# Patient Record
Sex: FEMALE | Race: OTHER | HISPANIC OR LATINO | URBAN - METROPOLITAN AREA
[De-identification: names, ages, dates, MRNs, and addresses within clinical notes are randomized per-mention and may not be internally consistent; named-entity substitution may affect disease eponyms.]

---

## 2024-08-03 PROBLEM — Z00.00 ENCOUNTER FOR PREVENTIVE HEALTH EXAMINATION: Status: ACTIVE | Noted: 2024-08-03

## 2024-08-09 ENCOUNTER — EMERGENCY (EMERGENCY)
Facility: HOSPITAL | Age: 49
LOS: 1 days | Discharge: HOME | End: 2024-08-09
Attending: EMERGENCY MEDICINE
Payer: MEDICAID

## 2024-08-09 VITALS
OXYGEN SATURATION: 98 % | TEMPERATURE: 98 F | RESPIRATION RATE: 18 BRPM | DIASTOLIC BLOOD PRESSURE: 75 MMHG | SYSTOLIC BLOOD PRESSURE: 112 MMHG | WEIGHT: 176.37 LBS | HEART RATE: 95 BPM

## 2024-08-09 VITALS
SYSTOLIC BLOOD PRESSURE: 118 MMHG | OXYGEN SATURATION: 98 % | TEMPERATURE: 98 F | RESPIRATION RATE: 18 BRPM | HEART RATE: 80 BPM | DIASTOLIC BLOOD PRESSURE: 79 MMHG

## 2024-08-09 LAB
ALBUMIN SERPL ELPH-MCNC: 3.5 G/DL — SIGNIFICANT CHANGE UP (ref 3.5–5)
ALP SERPL-CCNC: 75 U/L — SIGNIFICANT CHANGE UP (ref 40–120)
ALT FLD-CCNC: 53 U/L DA — SIGNIFICANT CHANGE UP (ref 10–60)
ANION GAP SERPL CALC-SCNC: 5 MMOL/L — SIGNIFICANT CHANGE UP (ref 5–17)
APPEARANCE UR: CLEAR — SIGNIFICANT CHANGE UP
AST SERPL-CCNC: 27 U/L — SIGNIFICANT CHANGE UP (ref 10–40)
BASOPHILS # BLD AUTO: 0.04 K/UL — SIGNIFICANT CHANGE UP (ref 0–0.2)
BASOPHILS NFR BLD AUTO: 0.4 % — SIGNIFICANT CHANGE UP (ref 0–2)
BILIRUB SERPL-MCNC: 0.2 MG/DL — SIGNIFICANT CHANGE UP (ref 0.2–1.2)
BILIRUB UR-MCNC: NEGATIVE — SIGNIFICANT CHANGE UP
BUN SERPL-MCNC: 13 MG/DL — SIGNIFICANT CHANGE UP (ref 7–18)
CALCIUM SERPL-MCNC: 9.1 MG/DL — SIGNIFICANT CHANGE UP (ref 8.4–10.5)
CHLORIDE SERPL-SCNC: 106 MMOL/L — SIGNIFICANT CHANGE UP (ref 96–108)
CO2 SERPL-SCNC: 25 MMOL/L — SIGNIFICANT CHANGE UP (ref 22–31)
COLOR SPEC: YELLOW — SIGNIFICANT CHANGE UP
CREAT SERPL-MCNC: 0.68 MG/DL — SIGNIFICANT CHANGE UP (ref 0.5–1.3)
DIFF PNL FLD: NEGATIVE — SIGNIFICANT CHANGE UP
EGFR: 107 ML/MIN/1.73M2 — SIGNIFICANT CHANGE UP
EOSINOPHIL # BLD AUTO: 0.21 K/UL — SIGNIFICANT CHANGE UP (ref 0–0.5)
EOSINOPHIL NFR BLD AUTO: 2.1 % — SIGNIFICANT CHANGE UP (ref 0–6)
GLUCOSE SERPL-MCNC: 93 MG/DL — SIGNIFICANT CHANGE UP (ref 70–99)
GLUCOSE UR QL: NEGATIVE MG/DL — SIGNIFICANT CHANGE UP
HCG SERPL-ACNC: <1 MIU/ML — SIGNIFICANT CHANGE UP
HCT VFR BLD CALC: 34.6 % — SIGNIFICANT CHANGE UP (ref 34.5–45)
HGB BLD-MCNC: 11.2 G/DL — LOW (ref 11.5–15.5)
IMM GRANULOCYTES NFR BLD AUTO: 0.2 % — SIGNIFICANT CHANGE UP (ref 0–0.9)
KETONES UR-MCNC: NEGATIVE MG/DL — SIGNIFICANT CHANGE UP
LEUKOCYTE ESTERASE UR-ACNC: NEGATIVE — SIGNIFICANT CHANGE UP
LIDOCAIN IGE QN: 43 U/L — SIGNIFICANT CHANGE UP (ref 13–75)
LYMPHOCYTES # BLD AUTO: 2.26 K/UL — SIGNIFICANT CHANGE UP (ref 1–3.3)
LYMPHOCYTES # BLD AUTO: 22.2 % — SIGNIFICANT CHANGE UP (ref 13–44)
MCHC RBC-ENTMCNC: 28.4 PG — SIGNIFICANT CHANGE UP (ref 27–34)
MCHC RBC-ENTMCNC: 32.4 GM/DL — SIGNIFICANT CHANGE UP (ref 32–36)
MCV RBC AUTO: 87.6 FL — SIGNIFICANT CHANGE UP (ref 80–100)
MONOCYTES # BLD AUTO: 0.71 K/UL — SIGNIFICANT CHANGE UP (ref 0–0.9)
MONOCYTES NFR BLD AUTO: 7 % — SIGNIFICANT CHANGE UP (ref 2–14)
NEUTROPHILS # BLD AUTO: 6.94 K/UL — SIGNIFICANT CHANGE UP (ref 1.8–7.4)
NEUTROPHILS NFR BLD AUTO: 68.1 % — SIGNIFICANT CHANGE UP (ref 43–77)
NITRITE UR-MCNC: NEGATIVE — SIGNIFICANT CHANGE UP
NRBC # BLD: 0 /100 WBCS — SIGNIFICANT CHANGE UP (ref 0–0)
PH UR: 7 — SIGNIFICANT CHANGE UP (ref 5–8)
PLATELET # BLD AUTO: 260 K/UL — SIGNIFICANT CHANGE UP (ref 150–400)
POTASSIUM SERPL-MCNC: 4 MMOL/L — SIGNIFICANT CHANGE UP (ref 3.5–5.3)
POTASSIUM SERPL-SCNC: 4 MMOL/L — SIGNIFICANT CHANGE UP (ref 3.5–5.3)
PROT SERPL-MCNC: 7.4 G/DL — SIGNIFICANT CHANGE UP (ref 6–8.3)
PROT UR-MCNC: NEGATIVE MG/DL — SIGNIFICANT CHANGE UP
RBC # BLD: 3.95 M/UL — SIGNIFICANT CHANGE UP (ref 3.8–5.2)
RBC # FLD: 16.6 % — HIGH (ref 10.3–14.5)
SODIUM SERPL-SCNC: 136 MMOL/L — SIGNIFICANT CHANGE UP (ref 135–145)
SP GR SPEC: 1.01 — SIGNIFICANT CHANGE UP (ref 1–1.03)
UROBILINOGEN FLD QL: 0.2 MG/DL — SIGNIFICANT CHANGE UP (ref 0.2–1)
WBC # BLD: 10.18 K/UL — SIGNIFICANT CHANGE UP (ref 3.8–10.5)
WBC # FLD AUTO: 10.18 K/UL — SIGNIFICANT CHANGE UP (ref 3.8–10.5)

## 2024-08-09 PROCEDURE — 80053 COMPREHEN METABOLIC PANEL: CPT

## 2024-08-09 PROCEDURE — 96374 THER/PROPH/DIAG INJ IV PUSH: CPT

## 2024-08-09 PROCEDURE — 74176 CT ABD & PELVIS W/O CONTRAST: CPT | Mod: 26,MC

## 2024-08-09 PROCEDURE — 84702 CHORIONIC GONADOTROPIN TEST: CPT

## 2024-08-09 PROCEDURE — 36415 COLL VENOUS BLD VENIPUNCTURE: CPT

## 2024-08-09 PROCEDURE — 74176 CT ABD & PELVIS W/O CONTRAST: CPT | Mod: MC

## 2024-08-09 PROCEDURE — 99284 EMERGENCY DEPT VISIT MOD MDM: CPT

## 2024-08-09 PROCEDURE — 81003 URINALYSIS AUTO W/O SCOPE: CPT

## 2024-08-09 PROCEDURE — 85025 COMPLETE CBC W/AUTO DIFF WBC: CPT

## 2024-08-09 PROCEDURE — 99284 EMERGENCY DEPT VISIT MOD MDM: CPT | Mod: 25

## 2024-08-09 PROCEDURE — 83690 ASSAY OF LIPASE: CPT

## 2024-08-09 RX ORDER — IBUPROFEN 200 MG
1 TABLET ORAL
Qty: 30 | Refills: 0
Start: 2024-08-09

## 2024-08-09 RX ORDER — KETOROLAC TROMETHAMINE 10 MG
15 TABLET ORAL ONCE
Refills: 0 | Status: DISCONTINUED | OUTPATIENT
Start: 2024-08-09 | End: 2024-08-09

## 2024-08-09 RX ORDER — BACTERIOSTATIC SODIUM CHLORIDE 0.9 %
1000 VIAL (ML) INJECTION ONCE
Refills: 0 | Status: COMPLETED | OUTPATIENT
Start: 2024-08-09 | End: 2024-08-09

## 2024-08-09 RX ADMIN — Medication 15 MILLIGRAM(S): at 18:17

## 2024-08-09 RX ADMIN — Medication 15 MILLIGRAM(S): at 18:47

## 2024-08-09 RX ADMIN — Medication 1000 MILLILITER(S): at 18:16

## 2024-08-09 NOTE — ED PROVIDER NOTE - PATIENT PORTAL LINK FT
You can access the FollowMyHealth Patient Portal offered by [facility] by registering at the following website: [email or web address]. By joining [facility]’s FollowMyHealth portal, you will also be able to view your health information using other applications (apps) compatible with our system.

## 2024-08-09 NOTE — ED PROVIDER NOTE - OBJECTIVE STATEMENT
48-year-old female history of kidney stones presents with bilateral flank pain from yesterday got worse today.  Pain rating to bilateral lower abdomen.  Patient notes some nausea no vomiting notes slight increased urinary frequency.  History of .

## 2024-08-09 NOTE — ED PROVIDER NOTE - NSFOLLOWUPINSTRUCTIONS_ED_ALL_ED_FT
Dolor de espalda lavelle en los adultos  Acute Back Pain, Adult  El dolor de espalda lavelle es repentino y por lo general no dura mucho tiempo. Se debe generalmente a randa lesión de los músculos y tejidos de la espalda. La lesión puede ser el resultado de:  Estiramiento en exceso o desgarro de un músculo, tendón o ligamento. Los ligamentos son tejidos que conectan los huesos. Levantar algo de forma incorrecta puede producir un esguince de espalda.  Desgaste (degeneración) de los discos vertebrales. Los discos vertebrales son tejidos circulares que proporcionan amortiguación entre los huesos de la columna vertebral (vértebras).  Movimientos de giro, caryn al practicar deportes o realizar trabajos de jardinería.  Un golpe en la espalda.  Artritis.  Es posible que le realicen un examen físico, análisis de laboratorio u otros estudios de diagnóstico por imágenes para encontrar la causa del dolor. El dolor de espalda lavelle generalmente desaparece con reposo y cuidados en la casa.    Siga estas instrucciones en sullivan casa:  Control del dolor, la rigidez y la hinchazón    Use los medicamentos de venta jamshid y los recetados solamente caryn se lo haya indicado el médico. El tratamiento puede incluir medicamentos para el dolor y la inflamación que se callum por la boca o que se aplican sobre la piel, o relajantes musculares.  El médico puede recomendarle que se aplique hielo jacinda las primeras 24 a 48 horas después del comienzo del dolor. Para hacer esto:  Ponga el hielo en randa bolsa plástica.  Coloque randa toalla entre la piel y la bolsa.  Aplique el hielo jacinda 20 minutos, 2 o 3 veces por día.  Retire el hielo si la piel se pone de color wong brillante. [place] es muy importante. Si no puede sentir dolor, calor o frío, tiene un mayor riesgo de que se dañe la tyrese.  Si se lo indican, aplique calor en la tyrese afectada con la frecuencia que le haya indicado el médico. Use la elsa de calor que el médico le recomiende, caryn randa compresa de calor húmedo o randa almohadilla térmica.  Coloque randa toalla entre la piel y la elsa de calor.  Aplique calor jacinda 20 a 30 minutos.  Retire la elsa de calor si la piel se pone de color wong brillante. [place] es especialmente importante si no puede sentir dolor, calor o frío. Corre un mayor riesgo de sufrir quemaduras.  Actividad    Comparisons of good and bad posture while driving, standing, sitting at a desk, and lifting heavy objects.  No permanezca en la cama. Hacer reposo en la cama por más de 1 a 2 días puede demorar sullivan recuperación.  Mantenga randa buena postura al sentarse y pararse. No se incline hacia adelante al sentarse ni se encorve al pararse.  Si trabaja en un escritorio, siéntese cerca de daniel para no tener que inclinarse. Mantenga el mentón hacia abajo. Mantenga el marina hacia atrás y los codos flexionados en un ángulo de 90 grados (ángulo recto).  Cuando conduzca, siéntese elevado y cerca del volante. Agregue un apoyo para la espalda (lumbar) al asiento del automóvil, si es necesario.  Realice caminatas cortas en superficies karan tan pronto caryn le sea posible. Trate de caminar un poco más de tiempo cada día.  No se siente, conduzca o permanezca de pie en un mismo lugar jacinda más de 30 minutos seguidos. Pararse o sentarse jacinda largos períodos de tiempo puede sobrecargar la espalda.  No conduzca ni use maquinaria pesada mientras luis analgésicos recetados.  Use técnicas apropiadas para levantar objetos. Cuando se inclina y levanta un objeto, utilice posiciones que no sobrecarguen tanto la espalda:  Flexione las rodillas.  Mantenga la carga cerca del cuerpo.  No se tuerza.  Alessandra actividad física habitualmente caryn se lo haya indicado el médico. Hacer ejercicios ayuda a que la espalda sane más rápido y ayuda a evitar las lesiones de la espalda al mantener los músculos pepe y flexibles.  Trabaje con un fisioterapeuta para crear un programa de ejercicios seguros, según lo recomiende el médico. Alessandra ejercicios caryn se lo haya indicado el fisioterapeuta.  Estilo de dirk    Mantenga un peso saludable. El sobrepeso sobrecarga la espalda y hace que resulte difícil tener randa buena postura.  Evite actividades o situaciones que lo chela sentirse ansioso o estresado. El estrés y la ansiedad aumentan la tensión muscular y pueden empeorar el dolor de espalda. Aprenda formas de manejar la ansiedad y el estrés, caryn a través del ejercicio.  Instrucciones generales    Duerma sobre un colchón firme en randa posición cómoda. Intente acostarse de costado, con las rodillas ligeramente flexionadas. Si se recuesta sobre la espalda, coloque randa almohada debajo de las rodillas.  Mantenga la rohini y el marina en línea recta con la columna vertebral (posición neutra) cuando use equipos electrónicos caryn teléfonos inteligentes o tablets. Para hacer esto:  Levante el teléfono inteligente o la tablet para mirarlo en lugar de inclinar la rohini o el marina para mirar hacia abajo.  Coloque el teléfono inteligente o la tablet al nivel de sullivan parisa mientras mary ann la pantalla.  Siga el plan de tratamiento caryn se lo haya indicado el médico. [place] puede incluir:  Terapia cognitiva o conductual.  Acupuntura o terapia de masajes.  Yoga o meditación.  Comuníquese con un médico si:  Siente un dolor que no se kate con reposo o medicamentos.  Siente mucho dolor que se extiende a las piernas o las nalgas.  El dolor no mejora luego de 2 semanas.  Siente dolor por la noche.  Pierde peso sin proponérselo.  Tiene fiebre o escalofríos.  Siente náuseas o vómitos.  Siente dolor abdominal.  Solicite ayuda de inmediato si:  Tiene nuevos problemas para controlar la vejiga o los intestinos.  Siente debilidad o adormecimiento inusuales en los brazos o en las piernas.  Siente que va a desmayarse.  Estos síntomas pueden representar un problema grave que constituye randa emergencia. No espere a lesa si los síntomas desaparecen. Solicite atención médica de inmediato. Comuníquese con el servicio de emergencias de sullivan localidad (911 en los [place]). No conduzca por sam propios medios hasta el hospital.    Resumen  El dolor de espalda lavelle es repentino y por lo general no dura mucho tiempo.  Use técnicas apropiadas para levantar objetos. Cuando se inclina y levanta un objeto, utilice posiciones que no sobrecarguen tanto la espalda.  [place] los medicamentos de venta jamshid y los recetados solamente caryn se lo haya indicado el médico, y aplíquese calor o hielo según las indicaciones.  Esta información no tiene caryn fin reemplazar el consejo del médico. Asegúrese de hacerle al médico cualquier pregunta que tenga.    Document Revised: 04/06/2022 Document Reviewed: 04/06/2022  Elsevier Patient Education © 2024 [employer]

## 2024-08-09 NOTE — ED PROVIDER NOTE - CLINICAL SUMMARY MEDICAL DECISION MAKING FREE TEXT BOX
Patient with lower back pain lower abdominal pain as well worse with movement and palpation.  Will do CAT scan labs pain control reassess.

## 2024-08-09 NOTE — ED PROVIDER NOTE - PHYSICAL EXAMINATION
Heart regular lungs clear bilateral flank mild tenderness palpation bilateral lower abdominal tenderness palpation abdomen is soft no midline spinal tenderness palpation awake alert not in any distress

## 2024-08-09 NOTE — ED ADULT NURSE NOTE - OBJECTIVE STATEMENT
Pt presents to the ED with c/o right flank pain radiating to the lower abdomen with nausea x 2 days. No fever or vomiting.

## 2024-08-09 NOTE — ED PROVIDER NOTE - PROGRESS NOTE DETAILS
CAT scan reveals arthritic changes.  No ureteral stones appreciated.  Incidental findings of enlarged ovary and an adrenal adenoma discussed with patient advised follow-up with PCP and GYN.  Patient and  verbalized understanding.  Home with ibuprofen.

## 2024-08-09 NOTE — ED ADULT NURSE NOTE - NSFALLUNIVINTERV_ED_ALL_ED
Bed/Stretcher in lowest position, wheels locked, appropriate side rails in place/Call bell, personal items and telephone in reach/Instruct patient to call for assistance before getting out of bed/chair/stretcher/Non-slip footwear applied when patient is off stretcher/[place] to call system/Physically safe environment - no spills, clutter or unnecessary equipment/Purposeful proactive rounding/Room/bathroom lighting operational, light cord in reach

## 2024-08-23 ENCOUNTER — APPOINTMENT (OUTPATIENT)
Age: 49
End: 2024-08-23

## 2024-08-23 VITALS
OXYGEN SATURATION: 97 % | BODY MASS INDEX: 24.11 KG/M2 | DIASTOLIC BLOOD PRESSURE: 68 MMHG | SYSTOLIC BLOOD PRESSURE: 107 MMHG | HEART RATE: 91 BPM | HEIGHT: 66 IN | WEIGHT: 150 LBS

## 2024-08-23 DIAGNOSIS — M75.31 CALCIFIC TENDINITIS OF RIGHT SHOULDER: ICD-10-CM

## 2024-08-23 DIAGNOSIS — M75.51 BURSITIS OF RIGHT SHOULDER: ICD-10-CM

## 2024-08-23 DIAGNOSIS — M75.52 BURSITIS OF LEFT SHOULDER: ICD-10-CM

## 2024-08-23 DIAGNOSIS — M89.8X1 OTHER SPECIFIED DISORDERS OF BONE, SHOULDER: ICD-10-CM

## 2024-08-23 PROCEDURE — 99203 OFFICE O/P NEW LOW 30 MIN: CPT

## 2024-08-23 RX ORDER — DICLOFENAC SODIUM 10 MG/G
1 GEL TOPICAL
Qty: 1 | Refills: 0 | Status: ACTIVE | COMMUNITY
Start: 2024-08-23 | End: 1900-01-01